# Patient Record
Sex: MALE | Race: WHITE | URBAN - NONMETROPOLITAN AREA
[De-identification: names, ages, dates, MRNs, and addresses within clinical notes are randomized per-mention and may not be internally consistent; named-entity substitution may affect disease eponyms.]

---

## 2024-01-29 ENCOUNTER — TELEPHONE (OUTPATIENT)
Dept: FAMILY MEDICINE CLINIC | Facility: CLINIC | Age: 54
End: 2024-01-29

## 2024-01-29 NOTE — TELEPHONE ENCOUNTER
PT WAS IN WITH MOM TODAY. STATES DR. KAY WILL TAKE HIM ON as A NEW PT.     CAN THIS BE CONFIRMED?  IF SO, PLEASE ROUTE TO  SO AN APPT CAN BE MADE.

## 2024-02-05 ENCOUNTER — OFFICE VISIT (OUTPATIENT)
Dept: FAMILY MEDICINE CLINIC | Facility: CLINIC | Age: 54
End: 2024-02-05
Payer: MEDICAID

## 2024-02-05 VITALS
BODY MASS INDEX: 25.1 KG/M2 | RESPIRATION RATE: 12 BRPM | HEIGHT: 68.5 IN | WEIGHT: 167.5 LBS | HEART RATE: 104 BPM | TEMPERATURE: 97 F | DIASTOLIC BLOOD PRESSURE: 72 MMHG | OXYGEN SATURATION: 97 % | SYSTOLIC BLOOD PRESSURE: 104 MMHG

## 2024-02-05 DIAGNOSIS — Z00.00 WELL ADULT EXAM: Primary | ICD-10-CM

## 2024-02-05 DIAGNOSIS — Z12.11 SCREEN FOR COLON CANCER: ICD-10-CM

## 2024-02-05 DIAGNOSIS — I25.10 ASCVD (ARTERIOSCLEROTIC CARDIOVASCULAR DISEASE): ICD-10-CM

## 2024-02-05 DIAGNOSIS — Z12.5 SCREENING FOR PROSTATE CANCER: ICD-10-CM

## 2024-02-05 DIAGNOSIS — J43.9 PULMONARY EMPHYSEMA, UNSPECIFIED EMPHYSEMA TYPE (HCC): ICD-10-CM

## 2024-02-05 PROCEDURE — 99386 PREV VISIT NEW AGE 40-64: CPT | Performed by: FAMILY MEDICINE

## 2024-02-05 RX ORDER — CLOPIDOGREL BISULFATE 75 MG/1
75 TABLET ORAL DAILY
COMMUNITY
Start: 2023-12-15

## 2024-02-05 RX ORDER — FUROSEMIDE 20 MG/1
20 TABLET ORAL DAILY
COMMUNITY
Start: 2023-12-15

## 2024-02-05 RX ORDER — POTASSIUM CHLORIDE 20 MEQ/1
20 TABLET, EXTENDED RELEASE ORAL 2 TIMES DAILY
COMMUNITY

## 2024-02-05 RX ORDER — ATORVASTATIN CALCIUM 10 MG/1
TABLET, FILM COATED ORAL
COMMUNITY
Start: 2023-12-14

## 2024-02-07 PROBLEM — J43.9 PULMONARY EMPHYSEMA (HCC): Status: ACTIVE | Noted: 2024-02-07

## 2024-02-07 PROBLEM — I25.10 ASCVD (ARTERIOSCLEROTIC CARDIOVASCULAR DISEASE): Status: ACTIVE | Noted: 2024-02-07

## 2024-02-08 NOTE — PROGRESS NOTES
Subjective:   Nolan Wade is a 53 year old male who presents for CPX     Patient here for evaluation  Establish care    Recent cad angina  And has had a stent    He also has copd and a  Quit smoking    Feels well  No known injury       History/Other:   has a current medication list which includes the following prescription(s): atorvastatin, clopidogrel, furosemide, metoprolol tartrate, and potassium chloride.     has no allergies on file.     Review of Systems:  GENERAL HEALTH: feels well no complaints  SKIN: denies any unusual skin lesions or rashes  RESPIRATORY: denies shortness of breath with exertion  CARDIOVASCULAR: denies chest pain on exertion  GI: denies abdominal pain and denies heartburn  NEURO: denies headaches    Objective:   /72 (BP Location: Right arm, Patient Position: Sitting, Cuff Size: large)   Pulse 104   Temp 97.3 °F (36.3 °C) (Temporal)   Resp 12   Ht 5' 8.5\" (1.74 m)   Wt 167 lb 8 oz (76 kg)   SpO2 97%   BMI 25.10 kg/m²  Estimated body mass index is 25.1 kg/m² as calculated from the following:    Height as of this encounter: 5' 8.5\" (1.74 m).    Weight as of this encounter: 167 lb 8 oz (76 kg).  GENERAL: well developed, well nourished,in no apparent distress  SKIN: no rashes,no suspicious lesions  NECK: supple,no adenopathy,no bruits  LUNGS: clear to auscultation  CARDIO: RRR without murmur  GI: good BS's,no masses, HSM or tenderness  EXTREMITIES: no cyanosis, clubbing or edema    Assessment & Plan:   1. Well adult exam (Primary)  2. Screening for prostate cancer  -     PSA Total, Screen; Future; Expected date: 02/05/2024  3. Screen for colon cancer  -     GASTRO - EXTERNAL  4. ASCVD (arteriosclerotic cardiovascular disease)  Overview:  Status post ptca 2023   Blood Pressure and Cardiac Medications            metoprolol tartrate 25 MG Oral Tab          Cholesterol Lowering Medications            atorvastatin 10 MG Oral Tab            5. Pulmonary emphysema, unspecified  emphysema type (HCC)  Overview:  Seen on ct scan 2023          Jose Strange MD, 2/7/2024, 6:17 PM

## 2024-02-27 RX ORDER — POTASSIUM CHLORIDE 20 MEQ/1
20 TABLET, EXTENDED RELEASE ORAL 2 TIMES DAILY
Qty: 180 TABLET | Refills: 1 | Status: SHIPPED | OUTPATIENT
Start: 2024-02-27 | End: 2024-08-25

## 2024-02-27 NOTE — TELEPHONE ENCOUNTER
potassium chloride (KLOR-CON M20) 20 MEQ Oral Tab CR   Last office visit:  2/5/24  No future appointments.  Last filled:  DO NOT SHOW WE FILLED   Last labs:

## 2024-05-03 ENCOUNTER — OFFICE VISIT (OUTPATIENT)
Dept: FAMILY MEDICINE CLINIC | Facility: CLINIC | Age: 54
End: 2024-05-03
Payer: MEDICAID

## 2024-05-03 VITALS
HEIGHT: 68.5 IN | TEMPERATURE: 98 F | BODY MASS INDEX: 27.9 KG/M2 | HEART RATE: 80 BPM | SYSTOLIC BLOOD PRESSURE: 110 MMHG | WEIGHT: 186.25 LBS | RESPIRATION RATE: 16 BRPM | DIASTOLIC BLOOD PRESSURE: 70 MMHG | OXYGEN SATURATION: 92 %

## 2024-05-03 DIAGNOSIS — R13.19 ESOPHAGEAL DYSPHAGIA: ICD-10-CM

## 2024-05-03 DIAGNOSIS — K08.9 POOR DENTITION: ICD-10-CM

## 2024-05-03 DIAGNOSIS — I25.10 ASCVD (ARTERIOSCLEROTIC CARDIOVASCULAR DISEASE): ICD-10-CM

## 2024-05-03 DIAGNOSIS — K21.00 GASTROESOPHAGEAL REFLUX DISEASE WITH ESOPHAGITIS WITHOUT HEMORRHAGE: Primary | ICD-10-CM

## 2024-05-03 PROCEDURE — 99214 OFFICE O/P EST MOD 30 MIN: CPT | Performed by: FAMILY MEDICINE

## 2024-05-03 RX ORDER — METOPROLOL SUCCINATE 25 MG/1
12.5 TABLET, EXTENDED RELEASE ORAL DAILY
COMMUNITY
Start: 2024-04-27

## 2024-05-03 RX ORDER — PANTOPRAZOLE SODIUM 40 MG/1
40 TABLET, DELAYED RELEASE ORAL
Qty: 30 TABLET | Refills: 2 | Status: SHIPPED | OUTPATIENT
Start: 2024-05-03 | End: 2024-08-01

## 2024-05-03 NOTE — PROGRESS NOTES
Subjective:   Nolan Wade is a 54 year old male who presents for Difficulty Swallowing (Trouble swallowing food and liquid, pt had heart attack in December, pt concerned this feeling is similar feeling.. started Sunday..)     Complains of  pain in center of chest with swallowing food  Feels like heartburn but thought his angina was heartburn also    Has tapered off some    Feels like he swallowed an air bubble    Feels a gurgling    No emesis and appetite normal    Not associated as much with activity      History/Other:   has a current medication list which includes the following prescription(s): metoprolol succinate er, pantoprazole, potassium chloride, atorvastatin, clopidogrel, and furosemide.     has no allergies on file.     Review of Systems:  GENERAL HEALTH: feels well no complaints  SKIN: denies any unusual skin lesions or rashes  RESPIRATORY: denies shortness of breath with exertion  CARDIOVASCULAR: denies chest pain on exertion  GI: see HPI   NEURO: denies headaches    Objective:   /70   Pulse 80   Temp 98 °F (36.7 °C) (Temporal)   Resp 16   Ht 5' 8.5\" (1.74 m)   Wt 186 lb 4 oz (84.5 kg)   SpO2 92%   BMI 27.91 kg/m²  Estimated body mass index is 27.91 kg/m² as calculated from the following:    Height as of this encounter: 5' 8.5\" (1.74 m).    Weight as of this encounter: 186 lb 4 oz (84.5 kg).  GENERAL: well developed, well nourished,in no apparent distress  SKIN: no rashes,no suspicious lesions  CARDIO: RRR without murmur  GI: good BS's,no masses, HSM or tenderness  EXTREMITIES: no cyanosis, clubbing or edema    Assessment & Plan:   1. Gastroesophageal reflux disease with esophagitis without hemorrhage (Primary)  -     GASTRO - EXTERNAL  -     Pantoprazole Sodium; Take 1 tablet (40 mg total) by mouth every morning before breakfast.  Dispense: 30 tablet; Refill: 2  2. ASCVD (arteriosclerotic cardiovascular disease)  Overview:  Status post ptca 2023   Blood Pressure and Cardiac  Medications            metoprolol tartrate 25 MG Oral Tab          Cholesterol Lowering Medications            atorvastatin 10 MG Oral Tab            Orders:  -     Cardio Referral - External  3. Esophageal dysphagia  -     GASTRO - EXTERNAL  -     Pantoprazole Sodium; Take 1 tablet (40 mg total) by mouth every morning before breakfast.  Dispense: 30 tablet; Refill: 2  4. Poor dentition  Comments:  pans full eval and treat and possible teeth pulling          Jose Strange MD, 5/3/2024, 8:21 AM

## 2024-08-09 DIAGNOSIS — R13.19 ESOPHAGEAL DYSPHAGIA: ICD-10-CM

## 2024-08-09 DIAGNOSIS — K21.00 GASTROESOPHAGEAL REFLUX DISEASE WITH ESOPHAGITIS WITHOUT HEMORRHAGE: ICD-10-CM

## 2024-08-09 RX ORDER — PANTOPRAZOLE SODIUM 40 MG/1
40 TABLET, DELAYED RELEASE ORAL
Qty: 30 TABLET | Refills: 0 | Status: SHIPPED | OUTPATIENT
Start: 2024-08-09

## 2024-08-09 RX ORDER — PANTOPRAZOLE SODIUM 40 MG/1
40 TABLET, DELAYED RELEASE ORAL
Qty: 30 TABLET | Refills: 0 | Status: SHIPPED | OUTPATIENT
Start: 2024-08-09 | End: 2024-08-09

## 2024-08-09 NOTE — TELEPHONE ENCOUNTER
pantoprazole 40 MG Oral Tab EC -[facility]- is completely out and reporting difficulty sleeping due to the heartburn-dsh

## 2024-08-09 NOTE — TELEPHONE ENCOUNTER
Request for refill on pantoprazole 40 mg    LOV  5-3-24    LAST LAB  1-31-24    LAST RX  6-28-24  #30    Next OV  No future appointments.      PROTOCOL  Gastrointestional Medication Protocol Passed08/09/2024 09:15 AM   Protocol Details In person appointment or virtual visit in the past 12 mos or appointment in next 3 mos       Refilled per protocol

## 2024-10-09 DIAGNOSIS — R13.19 ESOPHAGEAL DYSPHAGIA: ICD-10-CM

## 2024-10-09 DIAGNOSIS — K21.00 GASTROESOPHAGEAL REFLUX DISEASE WITH ESOPHAGITIS WITHOUT HEMORRHAGE: ICD-10-CM

## 2024-10-09 RX ORDER — PANTOPRAZOLE SODIUM 40 MG/1
40 TABLET, DELAYED RELEASE ORAL
Qty: 30 TABLET | Refills: 0 | Status: SHIPPED | OUTPATIENT
Start: 2024-10-09

## 2024-10-09 NOTE — TELEPHONE ENCOUNTER
Last office visit: 5/3/24  Last refill: 8/9/24  Labs Due: psa 2/5/24  No future appointments.  Rx Auth Request  Received: Today  [facility]  [facility] Clinical Staff  Caller: Unspecified (Today, 12:10 AM)   very important  Medications from outside sources need reconciliation.             Requested Renewals     Name from pharmacy: Pantoprazole Sodium 40 MG Oral Tablet Delayed Release         Will file in chart as: PANTOPRAZOLE 40 MG Oral Tab EC    Sig: TAKE 1 TABLET BY MOUTH BEFORE BREAKFAST    Disp: 30 tablet    Refills: 0    Start: 10/9/2024    Class: Normal    Non-formulary For: Gastroesophageal reflux disease with esophagitis without hemorrhage, Esophageal dysphagia    Last ordered: 2 months ago (8/9/2024) by Jose Strange MD    Last refill: 9/4/2024    Rx #: [id number]    Gastrointestional Medication Protocol Serggx55/09/2024 12:10 AM   Protocol Details In person appointment or virtual visit in the past 12 mos or appointment in next 3 mos      To be filled at: [address] [phone], [phone]

## 2024-11-07 DIAGNOSIS — K21.00 GASTROESOPHAGEAL REFLUX DISEASE WITH ESOPHAGITIS WITHOUT HEMORRHAGE: ICD-10-CM

## 2024-11-07 DIAGNOSIS — R13.19 ESOPHAGEAL DYSPHAGIA: ICD-10-CM

## 2024-11-08 RX ORDER — PANTOPRAZOLE SODIUM 40 MG/1
40 TABLET, DELAYED RELEASE ORAL
Qty: 30 TABLET | Refills: 0 | Status: SHIPPED | OUTPATIENT
Start: 2024-11-08

## 2024-11-12 ENCOUNTER — PATIENT MESSAGE (OUTPATIENT)
Dept: FAMILY MEDICINE CLINIC | Facility: CLINIC | Age: 54
End: 2024-11-12

## 2024-12-12 DIAGNOSIS — R13.19 ESOPHAGEAL DYSPHAGIA: ICD-10-CM

## 2024-12-12 DIAGNOSIS — K21.00 GASTROESOPHAGEAL REFLUX DISEASE WITH ESOPHAGITIS WITHOUT HEMORRHAGE: ICD-10-CM

## 2024-12-12 RX ORDER — PANTOPRAZOLE SODIUM 40 MG/1
40 TABLET, DELAYED RELEASE ORAL
Qty: 30 TABLET | Refills: 0 | Status: SHIPPED | OUTPATIENT
Start: 2024-12-12

## 2024-12-12 NOTE — TELEPHONE ENCOUNTER
LOV:5/3/24    LAST LAB:1/31/2024    LAST RX:  Medication Quantity Refills Start End   PANTOPRAZOLE 40 MG Oral Tab EC 30 tablet 0 11/8/2024 --   Sig:   TAKE 1 TABLET BY MOUTH BEFORE BREAKFAST         Next OV: No future appointments.       PROTOCOL    Gastrointestional Medication Protocol [id number]/12/2024 03:56 PM   Protocol Details In person appointment or virtual visit in the past 12 mos or appointment in next 3 mos

## 2025-01-13 DIAGNOSIS — R13.19 ESOPHAGEAL DYSPHAGIA: ICD-10-CM

## 2025-01-13 DIAGNOSIS — K21.00 GASTROESOPHAGEAL REFLUX DISEASE WITH ESOPHAGITIS WITHOUT HEMORRHAGE: ICD-10-CM

## 2025-01-13 RX ORDER — PANTOPRAZOLE SODIUM 40 MG/1
40 TABLET, DELAYED RELEASE ORAL
Qty: 30 TABLET | Refills: 0 | Status: SHIPPED | OUTPATIENT
Start: 2025-01-13

## 2025-01-13 NOTE — TELEPHONE ENCOUNTER
OV 05/03/24    REFILL 12/12/24 #30    No future appointments. Physical due by 02/05/25    Calling patient to schedule physical - having issues with insurance. Will schedule at a later time if possible.

## 2025-02-09 DIAGNOSIS — K21.00 GASTROESOPHAGEAL REFLUX DISEASE WITH ESOPHAGITIS WITHOUT HEMORRHAGE: ICD-10-CM

## 2025-02-09 DIAGNOSIS — R13.19 ESOPHAGEAL DYSPHAGIA: ICD-10-CM

## 2025-02-10 RX ORDER — PANTOPRAZOLE SODIUM 40 MG/1
40 TABLET, DELAYED RELEASE ORAL
Qty: 30 TABLET | Refills: 0 | Status: SHIPPED | OUTPATIENT
Start: 2025-02-10

## 2025-03-11 DIAGNOSIS — K21.00 GASTROESOPHAGEAL REFLUX DISEASE WITH ESOPHAGITIS WITHOUT HEMORRHAGE: ICD-10-CM

## 2025-03-11 DIAGNOSIS — R13.19 ESOPHAGEAL DYSPHAGIA: ICD-10-CM

## 2025-03-12 RX ORDER — PANTOPRAZOLE SODIUM 40 MG/1
40 TABLET, DELAYED RELEASE ORAL
Qty: 15 TABLET | Refills: 0 | Status: SHIPPED | OUTPATIENT
Start: 2025-03-12

## 2025-03-30 DIAGNOSIS — R13.19 ESOPHAGEAL DYSPHAGIA: ICD-10-CM

## 2025-03-30 DIAGNOSIS — K21.00 GASTROESOPHAGEAL REFLUX DISEASE WITH ESOPHAGITIS WITHOUT HEMORRHAGE: ICD-10-CM

## 2025-03-31 RX ORDER — PANTOPRAZOLE SODIUM 40 MG/1
40 TABLET, DELAYED RELEASE ORAL
Qty: 15 TABLET | Refills: 0 | Status: SHIPPED | OUTPATIENT
Start: 2025-03-31

## 2025-03-31 NOTE — TELEPHONE ENCOUNTER
Last office visit: 05/03/24  Last refill:  03/12/25  #15 w/ no refills    No future appointments.    Sending mychart-pt is overdue for office visit and labs.           Gastrointestional Medication Protocol [id number]/30/2025 10:44 AM   Protocol Details In person appointment or virtual visit in the past 12 mos or appointment in next 3 mos    Medication is active on med list

## 2025-04-15 DIAGNOSIS — R13.19 ESOPHAGEAL DYSPHAGIA: ICD-10-CM

## 2025-04-15 DIAGNOSIS — K21.00 GASTROESOPHAGEAL REFLUX DISEASE WITH ESOPHAGITIS WITHOUT HEMORRHAGE: ICD-10-CM

## 2025-04-15 RX ORDER — PANTOPRAZOLE SODIUM 40 MG/1
40 TABLET, DELAYED RELEASE ORAL
Qty: 15 TABLET | Refills: 0 | Status: SHIPPED | OUTPATIENT
Start: 2025-04-15

## 2025-04-15 NOTE — TELEPHONE ENCOUNTER
Pantoprazole   Last refill: 03/31/25  qtY: 15 tabs  W 0 refills  Last ov: 05/03/24  Last labs 01/31/24 comp    Requested Prescriptions     Pending Prescriptions Disp Refills    PANTOPRAZOLE 40 MG Oral Tab EC [Pharmacy Med Name: Pantoprazole Sodium 40 MG Oral Tablet Delayed Release] 15 tablet 0     Sig: TAKE 1 TABLET BY MOUTH BEFORE BREAKFAST     No future appointments.

## 2025-05-02 DIAGNOSIS — K21.00 GASTROESOPHAGEAL REFLUX DISEASE WITH ESOPHAGITIS WITHOUT HEMORRHAGE: ICD-10-CM

## 2025-05-02 DIAGNOSIS — R13.19 ESOPHAGEAL DYSPHAGIA: ICD-10-CM

## 2025-05-02 RX ORDER — PANTOPRAZOLE SODIUM 40 MG/1
40 TABLET, DELAYED RELEASE ORAL
Qty: 15 TABLET | Refills: 0 | Status: SHIPPED | OUTPATIENT
Start: 2025-05-02

## 2025-05-02 NOTE — TELEPHONE ENCOUNTER
Gastrointestional Medication Protocol Passed05/02/2025 11:46 AM   Protocol Details In person appointment or virtual visit in the past 12 mos or appointment in next 3 mos    Medication is active on med list          Last office visit:  05/03/24  Last refill:  04/15/25  #15, no refills    No future appointments.  Sending mychart to patient to schedule a complete physical

## 2025-05-15 DIAGNOSIS — R13.19 ESOPHAGEAL DYSPHAGIA: ICD-10-CM

## 2025-05-15 DIAGNOSIS — K21.00 GASTROESOPHAGEAL REFLUX DISEASE WITH ESOPHAGITIS WITHOUT HEMORRHAGE: ICD-10-CM

## 2025-05-15 RX ORDER — PANTOPRAZOLE SODIUM 40 MG/1
40 TABLET, DELAYED RELEASE ORAL
Qty: 15 TABLET | Refills: 0 | Status: SHIPPED | OUTPATIENT
Start: 2025-05-15

## 2025-06-02 DIAGNOSIS — K21.00 GASTROESOPHAGEAL REFLUX DISEASE WITH ESOPHAGITIS WITHOUT HEMORRHAGE: ICD-10-CM

## 2025-06-02 DIAGNOSIS — R13.19 ESOPHAGEAL DYSPHAGIA: ICD-10-CM

## 2025-06-02 RX ORDER — PANTOPRAZOLE SODIUM 40 MG/1
40 TABLET, DELAYED RELEASE ORAL
Qty: 14 TABLET | Refills: 0 | Status: SHIPPED | OUTPATIENT
Start: 2025-06-02

## 2025-06-02 NOTE — TELEPHONE ENCOUNTER
OV 05/03/24  REFILL 05/15/25 #15    No future appointments. redealize reminder for physical sent on 05/02/25 - Last read by Nolan Wade at 1:38PM on 5/2/2025.     redealize reminder sent again for patient to schedule physical.

## 2025-06-17 DIAGNOSIS — R13.19 ESOPHAGEAL DYSPHAGIA: ICD-10-CM

## 2025-06-17 DIAGNOSIS — K21.00 GASTROESOPHAGEAL REFLUX DISEASE WITH ESOPHAGITIS WITHOUT HEMORRHAGE: ICD-10-CM

## 2025-06-18 ENCOUNTER — PATIENT MESSAGE (OUTPATIENT)
Dept: FAMILY MEDICINE CLINIC | Facility: CLINIC | Age: 55
End: 2025-06-18

## 2025-06-18 RX ORDER — PANTOPRAZOLE SODIUM 40 MG/1
40 TABLET, DELAYED RELEASE ORAL
Qty: 14 TABLET | Refills: 0 | Status: SHIPPED | OUTPATIENT
Start: 2025-06-18

## 2025-06-18 NOTE — TELEPHONE ENCOUNTER
Last refill: 06/02/25  Qty 14 tabs  W/  0 refills  Last ov: 05/03/24    Requested Prescriptions     Pending Prescriptions Disp Refills    PANTOPRAZOLE 40 MG Oral Tab EC [Pharmacy Med Name: Pantoprazole Sodium 40 MG Oral Tablet Delayed Release] 14 tablet 0     Sig: TAKE 1 TABLET BY MOUTH ONCE DAILY BEFORE BREAKFAST     No future appointments.  [employer] MESSAGE SENT TO SCHEDULE